# Patient Record
Sex: MALE | Race: AMERICAN INDIAN OR ALASKA NATIVE
[De-identification: names, ages, dates, MRNs, and addresses within clinical notes are randomized per-mention and may not be internally consistent; named-entity substitution may affect disease eponyms.]

---

## 2017-10-10 ENCOUNTER — HOSPITAL ENCOUNTER (EMERGENCY)
Dept: HOSPITAL 14 - H.ER | Age: 52
LOS: 1 days | Discharge: HOME | End: 2017-10-11
Payer: COMMERCIAL

## 2017-10-10 VITALS
HEART RATE: 58 BPM | TEMPERATURE: 98.6 F | DIASTOLIC BLOOD PRESSURE: 81 MMHG | OXYGEN SATURATION: 100 % | RESPIRATION RATE: 16 BRPM | SYSTOLIC BLOOD PRESSURE: 128 MMHG
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
## 2017-10-10 DIAGNOSIS — S09.90XA: Primary | ICD-10-CM

## 2017-10-10 NOTE — ED PDOC
HPI:  Head Injury


Time Seen by Provider: 10/10/17 22:49


Chief Complaint (Nursing): Abnormal Skin Integrity


Chief Complaint (Provider): head injury


History Per: Patient


History/Exam Limitations: no limitations


Injury Occurred (Timing): Hours Ago: (1.5)


Additional History Per: Patient


Additional Complaint(s): 





51 y/o male presents for eval of head injury sustained 1.5 hours prior to 

arrival.  Patient states he was outside the laundry mat taking his bag out of a 

cart and when he stood up straight he hit the left side of his head on the 

corner of a metal awning.  Patient states he fell to the ground and is unsure 

if he completely lost consciousness.  Patient notes pain to left side of head.  

Denies nausea/vomiting, vision changes, extremity numbness/weakness.  Tetanus 

vaccine not up to date.





Past Medical History


Reviewed: Historical Data, Nursing Documentation, Vital Signs


Vital Signs: 





 Last Vital Signs











Temp  98.6 F   10/10/17 22:43


 


Pulse  58 L  10/10/17 22:43


 


Resp  16   10/10/17 22:43


 


BP  128/81   10/10/17 22:43


 


Pulse Ox  100   10/10/17 22:43














- Medical History


PMH: Asthma, Bronchitis





- Family History


Family History: States: Unknown Family Hx





- Home Medications


Home Medications: 


 Ambulatory Orders











 Medication  Instructions  Recorded


 


Albuterol [Proventil] 0.09 2 IH Q6 PRN #1 inhaler 10/12/15


 


Azithromycin [Zithromax Z-Jose] 250 mg PO DAILY #1 tab 10/12/15


 


Prednisone 3 tab-cap PO QAM #15 tab 10/12/15


 


Promethazine/Phenyleph/Codeine 2 tsp PO Q6 PRN #0 syr 10/12/15





[Promethazine Vc W/ Codeine 120 ml]  


 


Methylprednisolone [Medrol Dosepak] 4 mg PO ASDIR #1 pkg 10/29/15


 


Moxifloxacin Hydrochloride [Avelox] 400 mg PO QAM #9 tab 10/29/15














- Allergies


Allergies/Adverse Reactions: 


 Allergies











Allergy/AdvReac Type Severity Reaction Status Date / Time


 


No Known Allergies Allergy   Verified 10/29/15 18:38














Review of Systems


ROS Statement: Except As Marked, All Systems Reviewed And Found Negative


Neurological: Positive for: Headache





Physical Exam





- Reviewed


Nursing Documentation Reviewed: Yes


Vital Signs Reviewed: Yes





- Physical Exam


Appears: Positive for: Well, Non-toxic, No Acute Distress


Head Exam: Negative for: ATRAUMATIC (small skin avulsions left frontal and 

temporal scalp. + surronding tenderness.)


Skin: Positive for: Normal Color


Eye Exam: Positive for: Normal appearance, EOMI, PERRL


ENT: Positive for: Normal ENT Inspection


Cardiovascular/Chest: Positive for: Regular Rate, Rhythm


Respiratory: Positive for: Normal Breath Sounds


Gastrointestinal/Abdominal: Positive for: Normal Exam


Back: Positive for: Normal Inspection


Extremity: Positive for: Normal ROM


Neurologic/Psych: Positive for: Alert, Oriented





- ECG


O2 Sat by Pulse Oximetry: 100





- Progress


ED Course And Treament: 





EXAM:


CT Head Without Intravenous Contrast


CLINICAL HISTORY:


The patient is a 52 years male; Injury or trauma; Fall; Initial encounter; 

Blunt trauma (contusions or


hematomas); Additional info: Left-sided head injuy 10/10/2017 11:08 PM


TECHNIQUE:


Axial computed tomography images of the head/brain without intravenous 

contrast. All CT scans at


this facility use one or more dose reduction techniques, viz.: automated 

exposure control; ma/kV


adjustment per patient size (including targeted exams where dose is matched to 

indication; i.e. head);


or iterative reconstruction technique.


Coronal and sagittal reformatted images were created and reviewed.


COMPARISON:


No relevant prior studies available.


FINDINGS:


Brain: Streak artifact limits evaluation of the skull base. No evidence of 

acute intracranial


hemorrhage. Correlate clinically.. No significant white matter disease. No 

edema.


Ventricles: Unremarkable. No ventriculomegaly.


Bones/joints: No displaced fracture.


Soft tissues: Unremarkable.


Vasculature: Atherosclerotic calcifications of the carotid siphons.


Sinuses: Unremarkable as visualized. No acute sinusitis.


Mastoid air cells: Unremarkable as visualized. No mastoid effusion.


IMPRESSION:


Streak artifact limits evaluation of the skull base. No evidence of acute 

intracranial hemorrhage.


Correlate clinically





skin avulsions cleaned with normal saline, bacitracin applied.


Patient educated on findings, discharged with instructions to follow up PMD 2-3 

days.


Advised tylenol/ibuprofen PRN pain.  Neosporin application daily.


Return to ED for worsening/concerning symptoms.





Disposition





- Clinical Impression


Clinical Impression: 


 Head injury, Skin avulsion








- Patient ED Disposition


Is Patient to be Admitted: No


Counseled Patient/Family Regarding: Studies Performed, Diagnosis, Need For 

Followup





- Disposition


Disposition: Routine/Home


Disposition Time: 00:38


Condition: STABLE


Additional Instructions: 


Follow up with primary doctor in 2-3 days.


Take tylenol or ibuprofen as directed, as needed for pain.


Apply neosporin to scalp injuries daily.


Return to ED for worsening/concerning symptoms.


Instructions:  Head Injury (ED), Skin Avulsion (ED)


Forms:  [facility] (English)

## 2017-10-11 NOTE — CT
PROCEDURE:  CT HEAD WITHOUT CONTRAST.



HISTORY:

left-sided head injuy



COMPARISON:

None available. 



TECHNIQUE:

Axial computed tomography images were obtained through the head/brain 

without intravenous contrast.  



Radiation dose:



Total exam DLP = 753.17 mGy-cm.



This CT exam was performed using one or more of the following dose 

reduction techniques: Automated exposure control, adjustment of the 

mA and/or kV according to patient size, and/or use of iterative 

reconstruction technique.



FINDINGS:



HEMORRHAGE:

No intracranial hemorrhage. 



BRAIN:

No mass effect or edema.  No atrophy or chronic microvascular 

ischemic changes.



VENTRICLES:

Unremarkable. No hydrocephalus. 



CALVARIUM:

Unremarkable.



PARANASAL SINUSES:

Unremarkable as visualized. No significant inflammatory changes.



MASTOID AIR CELLS:

Unremarkable as visualized. No inflammatory changes.



OTHER FINDINGS:

None.



IMPRESSION:

No acute intracranial abnormalities. No significant findings to 

account for the clinical presentation.



________________________________________________



Concordant results (preliminary interpretation) provided by [employer].



Procedure Completed: 23:46



Preliminary ([employer]) Report: Dictated and Authenticated: 00:16.



Final Interpretation: 08:31 October 11, 2017.

## 2017-12-21 ENCOUNTER — HOSPITAL ENCOUNTER (EMERGENCY)
Dept: HOSPITAL 14 - H.ER | Age: 52
Discharge: HOME | End: 2017-12-21
Payer: COMMERCIAL

## 2017-12-21 VITALS
DIASTOLIC BLOOD PRESSURE: 79 MMHG | TEMPERATURE: 99 F | SYSTOLIC BLOOD PRESSURE: 126 MMHG | OXYGEN SATURATION: 97 % | RESPIRATION RATE: 18 BRPM

## 2017-12-21 VITALS — HEART RATE: 70 BPM

## 2017-12-21 DIAGNOSIS — J45.909: ICD-10-CM

## 2017-12-21 DIAGNOSIS — J06.9: Primary | ICD-10-CM

## 2017-12-21 NOTE — ED PDOC
HPI: General Adult


Time Seen by Provider: 12/21/17 11:38


Chief Complaint (Nursing): Chest Pain


History Per: Patient


Additional Complaint(s): 





Pt. states at 0400 this morning he developed bodyaches and cough without fever. 

States he also experiences chest pain which is present only with forceful 

coughing. Denies chest pain at rest. 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 Denies hemoptysis, SOB, 

recent travel, phlegm production, abdominal pain, headache, rash. 





Past Medical History


Reviewed: Historical Data, Nursing Documentation, Vital Signs


Vital Signs: 





 Last Vital Signs











Temp  99.0 F   12/21/17 11:21


 


Pulse  108 H  12/21/17 11:21


 


Resp  18   12/21/17 11:21


 


BP  126/79   12/21/17 11:21


 


Pulse Ox  97   12/21/17 11:21














- Medical History


PMH: Asthma, Bronchitis





- Surgical History


Surgical History: No Surg Hx





- Family History


Family History: States: Unknown Family Hx





- Home Medications


Home Medications: 


 Ambulatory Orders











 Medication  Instructions  Recorded


 


Albuterol [Proventil] 0.09 2 IH Q6 PRN #1 inhaler 10/12/15


 


Azithromycin [Zithromax Z-Jose] 250 mg PO DAILY #1 tab 10/12/15


 


Prednisone 3 tab-cap PO QAM #15 tab 10/12/15


 


Promethazine/Phenyleph/Codeine 2 tsp PO Q6 PRN #0 syr 10/12/15





[Promethazine Vc W/ Codeine 120 ml]  


 


Methylprednisolone [Medrol Dosepak] 4 mg PO ASDIR #1 pkg 10/29/15


 


Moxifloxacin Hydrochloride [Avelox] 400 mg PO QAM #9 tab 10/29/15


 


Albuterol HFA [Ventolin HFA 90 2 puff IH [id number] PRN #60 puff 12/21/17





mcg/actuation (8 g)]  


 


Promethazine DM [Phenergan DM 5 - 10 ml PO Q8 PRN #120 ml 12/21/17





Syrup]  














- Allergies


Allergies/Adverse Reactions: 


 Allergies











Allergy/AdvReac Type Severity Reaction Status Date / Time


 


No Known Allergies Allergy   Verified 10/29/15 18:38














Review of Systems


ROS Statement: Except As Marked, All Systems Reviewed And Found Negative


Respiratory: Positive for: Cough





Physical Exam





- Physical Exam


Appears: Positive for: Well, Non-toxic, No Acute Distress


Skin: Positive for: Normal Color, Warm.  Negative for: Rash


Eye Exam: Positive for: EOMI, Normal appearance, PERRL


ENT: Positive for: Normal ENT Inspection.  Negative for: Pharyngeal Erythema, 

Tonsillar Exudate, Tonsillar Swelling


Neck: Positive for: Normal, Painless ROM


Cardiovascular/Chest: Positive for: Regular Rate, Rhythm


Respiratory: Positive for: Normal Breath Sounds.  Negative for: Crackles, Rales

, Rhonchi, Wheezing, Respiratory Distress


Gastrointestinal/Abdominal: Positive for: Normal Exam, Soft.  Negative for: 

Tenderness


Back: Positive for: Normal Inspection


Extremity: Positive for: Normal ROM


Neurologic/Psych: Positive for: Alert, Oriented.  Negative for: Aphasia, Facial 

Droop





- ECG


O2 Sat by Pulse Oximetry: 97





- Progress


ED Course And Treament: 





Rapid flu: negative. 





Disposition





- Clinical Impression


Clinical Impression: 


 Upper respiratory infection








- Patient ED Disposition


Is Patient to be Admitted: No





- Disposition


Referrals: 


[facility] [Outside]


Disposition: Routine/Home


Disposition Time: 12:53


Condition: STABLE


Additional Instructions: 


Follow up with [facility] for further evaluation.


Return to ED immediately if symptoms persist or worsen. 


Prescriptions: 


Albuterol HFA [Ventolin HFA 90 mcg/actuation (8 g)] 2 puff IH [id number] PRN #60 

puff


 PRN Reason: Cough


Promethazine DM [Phenergan DM Syrup] 5 - 10 ml PO Q8 PRN #120 ml


 PRN Reason: Cough


Instructions:  Upper Respiratory Infection (ED)


Forms:  [facility] (English), [facility] ED School/Work Excuse


Print Language: ENGLISH

## 2017-12-22 NOTE — CARD
--------------- APPROVED REPORT --------------





EKG Measurement

Heart [id number]

ME [id number]

[id number]

[id number]

[id number]



<Conclusion>

Normal sinus rhythm

Possible Left atrial enlargement

Left axis deviation

Left ventricular hypertrophy

Abnormal ECG

## 2018-01-17 ENCOUNTER — HOSPITAL ENCOUNTER (EMERGENCY)
Dept: HOSPITAL 14 - H.ER | Age: 53
Discharge: HOME | End: 2018-01-17
Payer: COMMERCIAL

## 2018-01-17 VITALS
SYSTOLIC BLOOD PRESSURE: 130 MMHG | TEMPERATURE: 99.7 F | HEART RATE: 80 BPM | DIASTOLIC BLOOD PRESSURE: 86 MMHG | RESPIRATION RATE: 18 BRPM

## 2018-01-17 VITALS — OXYGEN SATURATION: 98 %

## 2018-01-17 DIAGNOSIS — J11.1: Primary | ICD-10-CM

## 2018-01-17 NOTE — ED PDOC
HPI: CCC, URI, Sore Throat


Time Seen by Provider: 01/17/18 14:23


Chief Complaint (Nursing): Flu-like Symptoms


Chief Complaint (Provider): Flu-like Symptoms


History Per: Patient


History/Exam Limitations: no limitations


Onset/Duration Of Symptoms: Days (x2)


Current Symptoms Are (Timing): Still Present


Sick Contacts (Context): None


Additional Complaint(s): 





51 y/o male presents to the emergency department complaining of cough with 

associated body aches, chills, and congestion, since yesterday evening. Cough 

is productive of white phlegm. Denies taking any medications for symptom relief 

prior to arrival. Upon arrival to ER, patient is febrile. Patient does have an 

inhaler at home, for questionable asthma. Denies any shortness of breath or 

wheezing at this time. No vomiting or diarrhea. Patient did not receive flu 

vaccine this year.





PMD: Dr. Chance Nichols 





Past Medical History


Reviewed: Historical Data, Nursing Documentation, Vital Signs


Vital Signs: 


 Last Vital Signs











Temp  99.7 F H  01/17/18 15:58


 


Pulse  80   01/17/18 15:58


 


Resp  18   01/17/18 15:58


 


BP  130/86   01/17/18 15:58


 


Pulse Ox  98   01/17/18 15:58














- Medical History


PMH: Asthma, Bronchitis





- Family History


Family History: States: Unknown Family Hx





- Social History


Current smoker - smoking cessation education provided: No





- Immunization History


Hx Tetanus Toxoid Vaccination: No


Hx Influenza Vaccination: No


Hx Pneumococcal Vaccination: No





- Home Medications


Home Medications: 


 Ambulatory Orders











 Medication  Instructions  Recorded


 


Albuterol [Proventil] 0.09 2 IH Q6 PRN #1 inhaler 10/12/15


 


Azithromycin [Zithromax Z-Jose] 250 mg PO DAILY #1 tab 10/12/15


 


Prednisone 3 tab-cap PO QAM #15 tab 10/12/15


 


Promethazine/Phenyleph/Codeine 2 tsp PO Q6 PRN #0 syr 10/12/15





[Promethazine Vc W/ Codeine 120 ml]  


 


Methylprednisolone [Medrol Dosepak] 4 mg PO ASDIR #1 pkg 10/29/15


 


Moxifloxacin Hydrochloride [Avelox] 400 mg PO QAM #9 tab 10/29/15


 


Albuterol HFA [Ventolin HFA 90 2 puff IH [id number] PRN #60 puff 12/21/17





mcg/actuation (8 g)]  


 


Promethazine DM [Phenergan DM 5 - 10 ml PO Q8 PRN #120 ml 12/21/17





Syrup]  


 


Acetaminophen [Acetaminophen Extra 2 tab PO Q6 PRN #24 tablet 01/17/18





Strength]  


 


Ibuprofen [Motrin Tab] 800 mg PO Q8 PRN #21 tab 01/17/18


 


Oseltamivir Phosphate [Tamiflu] 75 mg PO BID #9 capsule 01/17/18














- Allergies


Allergies/Adverse Reactions: 


 Allergies











Allergy/AdvReac Type Severity Reaction Status Date / Time


 


No Known Allergies Allergy   Verified 01/17/18 14:15














Review of Systems


ROS Statement: Except As Marked, All Systems Reviewed And Found Negative


Constitutional: Positive for: Fever, Chills


ENT: Positive for: Nose Congestion.  Negative for: Throat Pain


Respiratory: Positive for: Cough, Sputum.  Negative for: Shortness of Breath, 

Wheezing


Gastrointestinal: Negative for: Vomiting, Diarrhea


Musculoskeletal: Positive for: Other (body aches)





Physical Exam





- Reviewed


Nursing Documentation Reviewed: Yes


Vital Signs Reviewed: Yes





- Physical Exam


Appears: Positive for: Non-toxic, No Acute Distress


Head Exam: Positive for: ATRAUMATIC, NORMAL INSPECTION, NORMOCEPHALIC


Skin: Positive for: Normal Color, Warm, Dry


Eye Exam: Positive for: EOMI, Normal appearance, PERRL


ENT: Positive for: TM Is/Are (normal bilaterally), Nasal Congestion.  Negative 

for: Pharyngeal Erythema, Tonsillar Exudate


Neck: Positive for: Normal, Painless ROM, Supple


Cardiovascular/Chest: Positive for: Regular Rate, Rhythm


Respiratory: Positive for: Normal Breath Sounds.  Negative for: Rhonchi, 

Wheezing, Respiratory Distress


Gastrointestinal/Abdominal: Positive for: Normal Exam, Soft.  Negative for: 

Tenderness


Extremity: Positive for: Normal ROM.  Negative for: Deformity


Neurologic/Psych: Positive for: Alert, Oriented





- ECG


O2 Sat by Pulse Oximetry: 98 (RA)


Pulse Ox Interpretation: Normal





Medical Decision Making


Medical Decision Making: 


Initial Impression: 51 y/o male with flu-like symptoms





Time: 14:27


Initial Plan:


* Tylenol 975 mg PO


* Tamiflu 75 mg PO





--------------------------------------------------------------------------------

-----------------


Scribe Attestation:   


Documented by Laura Knight, acting as a scribe for Abel Sifuentes PA-C





Provider Scribe Attestation:


All medical record entries made by the Scribe were at my direction and 

personally dictated by me. I have reviewed the chart and agree that the record 

accurately reflects my personal performance of the history, physical exam, 

medical decision making, and the department course for this patient. I have 

also personally directed, reviewed, and agree with the discharge instructions 

and disposition.





Disposition





- Clinical Impression


Clinical Impression: 


 Influenza








- Patient ED Disposition


Is Patient to be Admitted: No





- Disposition


Disposition: Routine/Home


Disposition Time: 16:08


Condition: FAIR


Prescriptions: 


Acetaminophen [Acetaminophen Extra Strength] 2 tab PO Q6 PRN #24 tablet


 PRN Reason: Fever >100.4 F


Ibuprofen [Motrin Tab] 800 mg PO Q8 PRN #21 tab


 PRN Reason: Fever >100.4 F


Oseltamivir Phosphate [Tamiflu] 75 mg PO BID #9 capsule


Instructions:  Influenza (ED)


Forms:  [facility] (English), [facility] ED School/Work Excuse